# Patient Record
Sex: FEMALE | Race: WHITE | NOT HISPANIC OR LATINO | Employment: OTHER | URBAN - METROPOLITAN AREA
[De-identification: names, ages, dates, MRNs, and addresses within clinical notes are randomized per-mention and may not be internally consistent; named-entity substitution may affect disease eponyms.]

---

## 2021-01-04 ENCOUNTER — OFFICE VISIT (OUTPATIENT)
Dept: UROLOGY | Facility: CLINIC | Age: 56
End: 2021-01-04
Payer: COMMERCIAL

## 2021-01-04 VITALS
HEART RATE: 80 BPM | BODY MASS INDEX: 31.32 KG/M2 | WEIGHT: 188 LBS | DIASTOLIC BLOOD PRESSURE: 69 MMHG | SYSTOLIC BLOOD PRESSURE: 134 MMHG | HEIGHT: 65 IN

## 2021-01-04 DIAGNOSIS — N30.10 INTERSTITIAL CYSTITIS: ICD-10-CM

## 2021-01-04 DIAGNOSIS — R31.29 MICROSCOPIC HEMATURIA: Primary | ICD-10-CM

## 2021-01-04 LAB
ANION GAP SERPL CALC-SCNC: 10 MMOL/L (ref 8–17)
BUN/CREAT SERPL: 20.5 (ref 6–20)
CALCIUM SERPL-MCNC: 9.9 MG/DL (ref 8.6–10.2)
CARBON DIOXIDE, CO2: 29 MMOL/L (ref 22–29)
CHLORIDE: 99 MMOL/L (ref 98–107)
CREAT SERPL-MCNC: 0.62 MG/DL (ref 0.5–0.9)
GFR ESTIMATION: 99.22
GLUCOSE: 113 MG/DL (ref 74–106)
POTASSIUM: 5.1 MMOL/L (ref 3.5–5.1)
SODIUM: 138 MMOL/L (ref 136–145)
UREA NITROGEN (BUN): 12.7 MG/DL (ref 6–20)

## 2021-01-04 PROCEDURE — 36415 PR COLLECTION VENOUS BLOOD,VENIPUNCTURE: ICD-10-PCS | Mod: S$GLB,,, | Performed by: NURSE PRACTITIONER

## 2021-01-04 PROCEDURE — 99204 OFFICE O/P NEW MOD 45 MIN: CPT | Mod: S$GLB,,, | Performed by: NURSE PRACTITIONER

## 2021-01-04 PROCEDURE — 3008F PR BODY MASS INDEX (BMI) DOCUMENTED: ICD-10-PCS | Mod: CPTII,S$GLB,, | Performed by: NURSE PRACTITIONER

## 2021-01-04 PROCEDURE — 3008F BODY MASS INDEX DOCD: CPT | Mod: CPTII,S$GLB,, | Performed by: NURSE PRACTITIONER

## 2021-01-04 PROCEDURE — 36415 COLL VENOUS BLD VENIPUNCTURE: CPT | Mod: S$GLB,,, | Performed by: NURSE PRACTITIONER

## 2021-01-04 PROCEDURE — 99204 PR OFFICE/OUTPT VISIT, NEW, LEVL IV, 45-59 MIN: ICD-10-PCS | Mod: S$GLB,,, | Performed by: NURSE PRACTITIONER

## 2021-01-04 RX ORDER — METHENAMINE, SODIUM PHOSPHATE, MONOBASIC, ANHYDROUS, PHENYL SALICYLATE, METHYLENE BLUE AND HYOSCYAMINE SULFATE 118; 40.8; 36; 10; .12 MG/1; MG/1; MG/1; MG/1; MG/1
1 CAPSULE ORAL EVERY 6 HOURS PRN
COMMUNITY
Start: 2020-12-16 | End: 2021-02-19

## 2021-01-04 RX ORDER — LACTOBACIL 2/BIFIDO 1/S.THERMO 450B CELL
PACKET (EA) ORAL
COMMUNITY
Start: 2020-09-02

## 2021-01-04 RX ORDER — SERTRALINE HYDROCHLORIDE 100 MG/1
TABLET, FILM COATED ORAL
COMMUNITY
Start: 1998-12-29

## 2021-01-04 RX ORDER — METOPROLOL SUCCINATE 50 MG/1
TABLET, EXTENDED RELEASE ORAL
COMMUNITY
Start: 2000-12-29

## 2021-01-04 RX ORDER — CONJUGATED ESTROGENS AND MEDROXYPROGESTERONE ACETATE .45; 1.5 MG/1; MG/1
TABLET, SUGAR COATED ORAL
COMMUNITY
Start: 2018-12-29

## 2021-01-04 RX ORDER — OXYBUTYNIN CHLORIDE 10 MG/1
TABLET, EXTENDED RELEASE ORAL
COMMUNITY
Start: 2009-12-29 | End: 2021-02-03 | Stop reason: SDUPTHER

## 2021-01-04 RX ORDER — SULFASALAZINE 500 MG
2 TABLET ORAL 3 TIMES DAILY PRN
Refills: 0
Start: 2021-01-04 | End: 2022-08-11

## 2021-01-04 RX ORDER — PANCRELIPASE 24000; 76000; 120000 [USP'U]/1; [USP'U]/1; [USP'U]/1
CAPSULE, DELAYED RELEASE PELLETS ORAL
COMMUNITY
Start: 2019-01-29 | End: 2022-08-11 | Stop reason: ALTCHOICE

## 2021-01-04 RX ORDER — CHOLECALCIFEROL (VITAMIN D3) 125 MCG
CAPSULE ORAL
COMMUNITY
Start: 2019-01-02

## 2021-01-04 RX ORDER — ESOMEPRAZOLE MAGNESIUM 40 MG/1
CAPSULE, DELAYED RELEASE ORAL
COMMUNITY
Start: 1997-12-29

## 2021-01-04 RX ORDER — AMITRIPTYLINE HYDROCHLORIDE 25 MG/1
TABLET, FILM COATED ORAL
COMMUNITY
Start: 2009-12-29 | End: 2021-02-23

## 2021-01-11 ENCOUNTER — PROCEDURE VISIT (OUTPATIENT)
Dept: UROLOGY | Facility: CLINIC | Age: 56
End: 2021-01-11
Payer: COMMERCIAL

## 2021-01-11 VITALS
SYSTOLIC BLOOD PRESSURE: 158 MMHG | BODY MASS INDEX: 31.49 KG/M2 | HEART RATE: 86 BPM | HEIGHT: 65 IN | DIASTOLIC BLOOD PRESSURE: 89 MMHG | OXYGEN SATURATION: 93 % | RESPIRATION RATE: 18 BRPM | WEIGHT: 189 LBS

## 2021-01-11 DIAGNOSIS — N30.10 INTERSTITIAL CYSTITIS: ICD-10-CM

## 2021-01-11 DIAGNOSIS — R31.29 MICROSCOPIC HEMATURIA: Primary | ICD-10-CM

## 2021-01-11 DIAGNOSIS — R31.29 MICROSCOPIC HEMATURIA: ICD-10-CM

## 2021-01-11 PROCEDURE — 52000 CYSTOURETHROSCOPY: CPT | Mod: S$GLB,,, | Performed by: SPECIALIST

## 2021-01-11 PROCEDURE — 52000 PR CYSTOURETHROSCOPY: ICD-10-PCS | Mod: S$GLB,,, | Performed by: SPECIALIST

## 2021-01-11 RX ORDER — CIPROFLOXACIN 500 MG/1
500 TABLET ORAL
Status: COMPLETED | OUTPATIENT
Start: 2021-01-11 | End: 2021-01-11

## 2021-01-11 RX ORDER — HYDROXYZINE HYDROCHLORIDE 25 MG/1
TABLET, FILM COATED ORAL
COMMUNITY
Start: 2009-01-10 | End: 2021-03-30 | Stop reason: SDUPTHER

## 2021-01-11 RX ADMIN — CIPROFLOXACIN 500 MG: 500 TABLET ORAL at 09:01

## 2021-01-19 ENCOUNTER — CLINICAL SUPPORT (OUTPATIENT)
Dept: UROLOGY | Facility: CLINIC | Age: 56
End: 2021-01-19
Payer: COMMERCIAL

## 2021-01-19 DIAGNOSIS — R31.29 MICROSCOPIC HEMATURIA: Primary | ICD-10-CM

## 2021-01-21 ENCOUNTER — PATIENT MESSAGE (OUTPATIENT)
Dept: UROLOGY | Facility: CLINIC | Age: 56
End: 2021-01-21

## 2021-01-22 DIAGNOSIS — N30.10 INTERSTITIAL CYSTITIS: Primary | ICD-10-CM

## 2021-01-22 RX ORDER — TRAMADOL HYDROCHLORIDE 50 MG/1
50 TABLET ORAL EVERY 6 HOURS
Qty: 15 TABLET | Refills: 0 | Status: SHIPPED | OUTPATIENT
Start: 2021-01-22 | End: 2021-08-23

## 2021-01-26 ENCOUNTER — TELEPHONE (OUTPATIENT)
Dept: UROLOGY | Facility: CLINIC | Age: 56
End: 2021-01-26

## 2021-01-26 ENCOUNTER — OUTSIDE PLACE OF SERVICE (OUTPATIENT)
Dept: UROLOGY | Facility: CLINIC | Age: 56
End: 2021-01-26

## 2021-01-26 PROCEDURE — 52214 CYSTOSCOPY AND TREATMENT: CPT | Mod: ,,, | Performed by: SPECIALIST

## 2021-01-26 PROCEDURE — 52214 PR CYSTOURETHROSCOPY,FULGURATN: ICD-10-PCS | Mod: ,,, | Performed by: SPECIALIST

## 2021-01-29 LAB — SPECIMEN TO PATHOLOGY: NORMAL

## 2021-02-03 ENCOUNTER — TELEPHONE (OUTPATIENT)
Dept: UROLOGY | Facility: CLINIC | Age: 56
End: 2021-02-03

## 2021-02-03 RX ORDER — OXYBUTYNIN CHLORIDE 10 MG/1
10 TABLET, EXTENDED RELEASE ORAL DAILY
Qty: 30 TABLET | Refills: 11 | Status: SHIPPED | OUTPATIENT
Start: 2021-02-03 | End: 2021-12-27 | Stop reason: SDUPTHER

## 2021-02-08 ENCOUNTER — PATIENT MESSAGE (OUTPATIENT)
Dept: UROLOGY | Facility: CLINIC | Age: 56
End: 2021-02-08

## 2021-02-18 ENCOUNTER — PATIENT MESSAGE (OUTPATIENT)
Dept: UROLOGY | Facility: CLINIC | Age: 56
End: 2021-02-18

## 2021-02-19 RX ORDER — HYOSCYAMINE SULFATE 0.12 MG/1
TABLET SUBLINGUAL
COMMUNITY
Start: 2021-01-26 | End: 2022-03-17

## 2021-02-22 ENCOUNTER — PATIENT MESSAGE (OUTPATIENT)
Dept: UROLOGY | Facility: CLINIC | Age: 56
End: 2021-02-22

## 2021-02-22 ENCOUNTER — TELEPHONE (OUTPATIENT)
Dept: UROLOGY | Facility: CLINIC | Age: 56
End: 2021-02-22

## 2021-02-22 DIAGNOSIS — N30.10 INTERSTITIAL CYSTITIS: Primary | ICD-10-CM

## 2021-02-23 RX ORDER — AMITRIPTYLINE HYDROCHLORIDE 50 MG/1
50 TABLET, FILM COATED ORAL NIGHTLY
Qty: 30 TABLET | Refills: 11 | Status: SHIPPED | OUTPATIENT
Start: 2021-02-23 | End: 2022-03-07 | Stop reason: SDUPTHER

## 2021-02-25 ENCOUNTER — OFFICE VISIT (OUTPATIENT)
Dept: UROLOGY | Facility: CLINIC | Age: 56
End: 2021-02-25
Payer: COMMERCIAL

## 2021-02-25 VITALS
BODY MASS INDEX: 31.49 KG/M2 | DIASTOLIC BLOOD PRESSURE: 74 MMHG | SYSTOLIC BLOOD PRESSURE: 135 MMHG | HEIGHT: 65 IN | WEIGHT: 189 LBS

## 2021-02-25 DIAGNOSIS — N30.10 INTERSTITIAL CYSTITIS: Primary | ICD-10-CM

## 2021-02-25 DIAGNOSIS — R31.29 MICROSCOPIC HEMATURIA: ICD-10-CM

## 2021-02-25 PROCEDURE — 3008F BODY MASS INDEX DOCD: CPT | Mod: CPTII,S$GLB,, | Performed by: SPECIALIST

## 2021-02-25 PROCEDURE — 99213 OFFICE O/P EST LOW 20 MIN: CPT | Mod: S$GLB,,, | Performed by: SPECIALIST

## 2021-02-25 PROCEDURE — 3008F PR BODY MASS INDEX (BMI) DOCUMENTED: ICD-10-PCS | Mod: CPTII,S$GLB,, | Performed by: SPECIALIST

## 2021-02-25 PROCEDURE — 99213 PR OFFICE/OUTPT VISIT, EST, LEVL III, 20-29 MIN: ICD-10-PCS | Mod: S$GLB,,, | Performed by: SPECIALIST

## 2021-02-26 ENCOUNTER — IMMUNIZATION (OUTPATIENT)
Dept: HEMATOLOGY/ONCOLOGY | Facility: CLINIC | Age: 56
End: 2021-02-26
Payer: COMMERCIAL

## 2021-02-26 DIAGNOSIS — Z23 NEED FOR VACCINATION: Primary | ICD-10-CM

## 2021-02-26 PROCEDURE — 0011A COVID-19, MRNA, LNP-S, PF, 100 MCG/0.5 ML DOSE VACCINE: ICD-10-PCS | Mod: S$GLB,,, | Performed by: FAMILY MEDICINE

## 2021-02-26 PROCEDURE — 91301 COVID-19, MRNA, LNP-S, PF, 100 MCG/0.5 ML DOSE VACCINE: ICD-10-PCS | Mod: S$GLB,,, | Performed by: FAMILY MEDICINE

## 2021-02-26 PROCEDURE — 91301 COVID-19, MRNA, LNP-S, PF, 100 MCG/0.5 ML DOSE VACCINE: CPT | Mod: S$GLB,,, | Performed by: FAMILY MEDICINE

## 2021-02-26 PROCEDURE — 0011A COVID-19, MRNA, LNP-S, PF, 100 MCG/0.5 ML DOSE VACCINE: CPT | Mod: S$GLB,,, | Performed by: FAMILY MEDICINE

## 2021-03-26 ENCOUNTER — IMMUNIZATION (OUTPATIENT)
Dept: HEMATOLOGY/ONCOLOGY | Facility: CLINIC | Age: 56
End: 2021-03-26
Payer: COMMERCIAL

## 2021-03-26 DIAGNOSIS — Z23 NEED FOR VACCINATION: Primary | ICD-10-CM

## 2021-03-26 PROCEDURE — 91301 COVID-19, MRNA, LNP-S, PF, 100 MCG/0.5 ML DOSE VACCINE: CPT | Mod: S$GLB,,, | Performed by: FAMILY MEDICINE

## 2021-03-26 PROCEDURE — 0012A COVID-19, MRNA, LNP-S, PF, 100 MCG/0.5 ML DOSE VACCINE: CPT | Mod: CV19,S$GLB,, | Performed by: FAMILY MEDICINE

## 2021-03-26 PROCEDURE — 0012A COVID-19, MRNA, LNP-S, PF, 100 MCG/0.5 ML DOSE VACCINE: ICD-10-PCS | Mod: CV19,S$GLB,, | Performed by: FAMILY MEDICINE

## 2021-03-26 PROCEDURE — 91301 COVID-19, MRNA, LNP-S, PF, 100 MCG/0.5 ML DOSE VACCINE: ICD-10-PCS | Mod: S$GLB,,, | Performed by: FAMILY MEDICINE

## 2021-03-29 ENCOUNTER — PATIENT MESSAGE (OUTPATIENT)
Dept: UROLOGY | Facility: CLINIC | Age: 56
End: 2021-03-29

## 2021-03-30 ENCOUNTER — TELEPHONE (OUTPATIENT)
Dept: UROLOGY | Facility: CLINIC | Age: 56
End: 2021-03-30

## 2021-03-30 DIAGNOSIS — N30.10 INTERSTITIAL CYSTITIS: Primary | ICD-10-CM

## 2021-03-30 RX ORDER — HYDROXYZINE HYDROCHLORIDE 25 MG/1
25 TABLET, FILM COATED ORAL DAILY
Qty: 30 TABLET | Refills: 2 | Status: SHIPPED | OUTPATIENT
Start: 2021-03-30 | End: 2021-06-24

## 2021-08-23 ENCOUNTER — OFFICE VISIT (OUTPATIENT)
Dept: UROLOGY | Facility: CLINIC | Age: 56
End: 2021-08-23
Payer: COMMERCIAL

## 2021-08-23 VITALS
HEART RATE: 73 BPM | HEIGHT: 65 IN | DIASTOLIC BLOOD PRESSURE: 81 MMHG | SYSTOLIC BLOOD PRESSURE: 163 MMHG | WEIGHT: 185 LBS | BODY MASS INDEX: 30.82 KG/M2

## 2021-08-23 DIAGNOSIS — N30.10 INTERSTITIAL CYSTITIS: Primary | ICD-10-CM

## 2021-08-23 DIAGNOSIS — R31.29 MICROSCOPIC HEMATURIA: ICD-10-CM

## 2021-08-23 PROCEDURE — 1159F MED LIST DOCD IN RCRD: CPT | Mod: CPTII,S$GLB,, | Performed by: NURSE PRACTITIONER

## 2021-08-23 PROCEDURE — 3008F BODY MASS INDEX DOCD: CPT | Mod: CPTII,S$GLB,, | Performed by: NURSE PRACTITIONER

## 2021-08-23 PROCEDURE — 1159F PR MEDICATION LIST DOCUMENTED IN MEDICAL RECORD: ICD-10-PCS | Mod: CPTII,S$GLB,, | Performed by: NURSE PRACTITIONER

## 2021-08-23 PROCEDURE — 3079F PR MOST RECENT DIASTOLIC BLOOD PRESSURE 80-89 MM HG: ICD-10-PCS | Mod: CPTII,S$GLB,, | Performed by: NURSE PRACTITIONER

## 2021-08-23 PROCEDURE — 99213 OFFICE O/P EST LOW 20 MIN: CPT | Mod: S$GLB,,, | Performed by: NURSE PRACTITIONER

## 2021-08-23 PROCEDURE — 1160F RVW MEDS BY RX/DR IN RCRD: CPT | Mod: CPTII,S$GLB,, | Performed by: NURSE PRACTITIONER

## 2021-08-23 PROCEDURE — 3077F PR MOST RECENT SYSTOLIC BLOOD PRESSURE >= 140 MM HG: ICD-10-PCS | Mod: CPTII,S$GLB,, | Performed by: NURSE PRACTITIONER

## 2021-08-23 PROCEDURE — 99213 PR OFFICE/OUTPT VISIT, EST, LEVL III, 20-29 MIN: ICD-10-PCS | Mod: S$GLB,,, | Performed by: NURSE PRACTITIONER

## 2021-08-23 PROCEDURE — 3008F PR BODY MASS INDEX (BMI) DOCUMENTED: ICD-10-PCS | Mod: CPTII,S$GLB,, | Performed by: NURSE PRACTITIONER

## 2021-08-23 PROCEDURE — 3079F DIAST BP 80-89 MM HG: CPT | Mod: CPTII,S$GLB,, | Performed by: NURSE PRACTITIONER

## 2021-08-23 PROCEDURE — 1160F PR REVIEW ALL MEDS BY PRESCRIBER/CLIN PHARMACIST DOCUMENTED: ICD-10-PCS | Mod: CPTII,S$GLB,, | Performed by: NURSE PRACTITIONER

## 2021-08-23 PROCEDURE — 3077F SYST BP >= 140 MM HG: CPT | Mod: CPTII,S$GLB,, | Performed by: NURSE PRACTITIONER

## 2021-08-23 RX ORDER — FLUTICASONE FUROATE AND VILANTEROL TRIFENATATE 100; 25 UG/1; UG/1
POWDER RESPIRATORY (INHALATION)
COMMUNITY
Start: 2021-08-21 | End: 2022-08-11

## 2021-12-27 DIAGNOSIS — N30.10 INTERSTITIAL CYSTITIS: ICD-10-CM

## 2021-12-28 RX ORDER — OXYBUTYNIN CHLORIDE 10 MG/1
10 TABLET, EXTENDED RELEASE ORAL DAILY
Qty: 30 TABLET | Refills: 11 | Status: SHIPPED | OUTPATIENT
Start: 2021-12-28 | End: 2022-06-30

## 2021-12-28 RX ORDER — HYDROXYZINE HYDROCHLORIDE 25 MG/1
25 TABLET, FILM COATED ORAL DAILY
Qty: 30 TABLET | Refills: 2 | Status: SHIPPED | OUTPATIENT
Start: 2021-12-28 | End: 2022-05-05

## 2022-01-31 ENCOUNTER — TELEPHONE (OUTPATIENT)
Dept: UROLOGY | Facility: CLINIC | Age: 57
End: 2022-01-31

## 2022-01-31 ENCOUNTER — OFFICE VISIT (OUTPATIENT)
Dept: UROLOGY | Facility: CLINIC | Age: 57
End: 2022-01-31
Payer: COMMERCIAL

## 2022-01-31 VITALS — DIASTOLIC BLOOD PRESSURE: 83 MMHG | SYSTOLIC BLOOD PRESSURE: 180 MMHG | HEART RATE: 76 BPM

## 2022-01-31 DIAGNOSIS — R31.29 MICROSCOPIC HEMATURIA: ICD-10-CM

## 2022-01-31 DIAGNOSIS — N30.10 INTERSTITIAL CYSTITIS: Primary | ICD-10-CM

## 2022-01-31 PROCEDURE — 3077F SYST BP >= 140 MM HG: CPT | Mod: CPTII,S$GLB,, | Performed by: NURSE PRACTITIONER

## 2022-01-31 PROCEDURE — 81001 URINALYSIS AUTO W/SCOPE: CPT | Mod: S$GLB,,, | Performed by: NURSE PRACTITIONER

## 2022-01-31 PROCEDURE — 3079F PR MOST RECENT DIASTOLIC BLOOD PRESSURE 80-89 MM HG: ICD-10-PCS | Mod: CPTII,S$GLB,, | Performed by: NURSE PRACTITIONER

## 2022-01-31 PROCEDURE — 1160F RVW MEDS BY RX/DR IN RCRD: CPT | Mod: CPTII,S$GLB,, | Performed by: NURSE PRACTITIONER

## 2022-01-31 PROCEDURE — 1159F MED LIST DOCD IN RCRD: CPT | Mod: CPTII,S$GLB,, | Performed by: NURSE PRACTITIONER

## 2022-01-31 PROCEDURE — 99213 OFFICE O/P EST LOW 20 MIN: CPT | Mod: S$GLB,,, | Performed by: NURSE PRACTITIONER

## 2022-01-31 PROCEDURE — 1160F PR REVIEW ALL MEDS BY PRESCRIBER/CLIN PHARMACIST DOCUMENTED: ICD-10-PCS | Mod: CPTII,S$GLB,, | Performed by: NURSE PRACTITIONER

## 2022-01-31 PROCEDURE — 1159F PR MEDICATION LIST DOCUMENTED IN MEDICAL RECORD: ICD-10-PCS | Mod: CPTII,S$GLB,, | Performed by: NURSE PRACTITIONER

## 2022-01-31 PROCEDURE — 3077F PR MOST RECENT SYSTOLIC BLOOD PRESSURE >= 140 MM HG: ICD-10-PCS | Mod: CPTII,S$GLB,, | Performed by: NURSE PRACTITIONER

## 2022-01-31 PROCEDURE — 3079F DIAST BP 80-89 MM HG: CPT | Mod: CPTII,S$GLB,, | Performed by: NURSE PRACTITIONER

## 2022-01-31 PROCEDURE — 99213 PR OFFICE/OUTPT VISIT, EST, LEVL III, 20-29 MIN: ICD-10-PCS | Mod: S$GLB,,, | Performed by: NURSE PRACTITIONER

## 2022-01-31 PROCEDURE — 81001 PR  URINALYSIS, AUTO, W/SCOPE: ICD-10-PCS | Mod: S$GLB,,, | Performed by: NURSE PRACTITIONER

## 2022-01-31 RX ORDER — FUROSEMIDE 20 MG/1
TABLET ORAL
COMMUNITY
Start: 2021-09-07 | End: 2022-03-17

## 2022-01-31 NOTE — TELEPHONE ENCOUNTER
MD agreed with plan of care, please call patient to pick date for cystoscopy with hydrodistension.

## 2022-01-31 NOTE — PROGRESS NOTES
Chief Complaint:   Chief Complaint   Patient presents with    Other     IC flare up       HPI: 56-year-old female who presents for follow up interstitial cystitis.     She initially presented to us with complaints of a flare up of interstitial cystitis.  She was found to have microscopic hematuria.  She underwent full workup with a CT urogram as well as office cystoscopy. Office cystoscopy showed an area on the left lateral wall suspicious of a Hunner's ulcer.  She was taken to the operating room on January 26, 2021 for hydrodistension and fulguration of Hunner's ulcer. She has been doing a lot better post procedure, urinary frequency decreased and dysuria almost completely resolved.    She recently began experiencing exacerbation of symptoms. She has increased her dose of PRN aloe vera which subsequently caused an upper body rash. She presented to her dermatologist prior to her office visit today which has prescribed steroids. She is interested in undergoing another cystoscopy with hydro distension to help with her symptoms.  She is still maintained on amitriptyline, hydroxyzine, oxybutynin, and aloe vera p.r.n.    She denies any other new urological complaints.    Allergies:  Penicillins    Medications: has a current medication list which includes the following prescription(s): aloe vera, amitriptyline, biotin, breo ellipta, esomeprazole, prempro, furosemide, hydroxyzine hcl, hyoscyamine, visbiome, creon, metoprolol succinate, oxybutynin, and sertraline.    Review of Systems:  General: No fever, chills, vision changes, dizziness, weakness, fatigue, unexplained weight loss, confusion, or mood swings.  Skin: No rashes, itching, or changes in color/texture of skin.  Chest: Denies OROZCO, cyanosis, wheezing, cough, and sputum production.  Abdomen: Appetite fine. Denies diarrhea, abdominal pain, hematemesis, or blood in stool.  Musculoskeletal: No joint stiffness or swelling. No painful lymph nodes  : As above.  All  other review of systems negative.    PMH:   has a past medical history of Glaucoma, Hypertension, and Interstitial cystitis.    PSH:   has a past surgical history that includes Gallbladder surgery (02/2019); Cystogastrostomy; Tonsillectomy; and Cystoscopy.    FamHx: family history includes Diabetes in her mother; Glaucoma in her father; Hypertension in her father and mother; Liver disease in her mother.    SocHx:  reports that she has never smoked. She has never used smokeless tobacco. She reports current alcohol use of about 2.0 standard drinks of alcohol per week. She reports that she does not use drugs.      Physical Exam:  Vitals:    01/31/22 1517   BP: (!) 180/83   Pulse: 76     General: AAOx3, no apparent distress, no deformities  Neck: supple, no masses, normal thyroid, full ROM  Lungs: CTAB, no adventitious breath sounds, normal inspiration, no use of accessory muscles  Heart: regular rate and rhythm, no arrhythmias  Abdomen: soft, NT, ND, no masses, no hernias, no hepatosplenomegaly  Lymphatic: no unusually enlarged or tender lymph nodes  Skin: warm and dry, no jaundice, +rash to chest and BUE  Ext: without edema or deformity, SAUCEDA, ambulates independently  : deferred    Labs/Studies: UA voided sample negative for infection or hematuria    Impression/Plan:   Interstitial cystitis  Comments:  s/p hydrodistension 1/2021-ready for procedure again d/t exacerbation, education provided, cont oxybutynin, amitriptyline, hydroxyzine, and aloe vera PRN  Orders:  -     POCT Urinalysis (w/Micro Option)    Microscopic hematuria  Comments:  complete workup in early 2021, no episodes of gross hematuria    **attempted to contact MD at the time of office visit, will verify plan prior to proceeding**    Follow up for cystoscopy with hydrodistension.

## 2022-02-02 ENCOUNTER — PATIENT MESSAGE (OUTPATIENT)
Dept: UROLOGY | Facility: CLINIC | Age: 57
End: 2022-02-02
Payer: COMMERCIAL

## 2022-02-02 RX ORDER — PHENAZOPYRIDINE HYDROCHLORIDE 100 MG/1
100 TABLET, FILM COATED ORAL 3 TIMES DAILY PRN
Qty: 6 TABLET | Refills: 0 | Status: SHIPPED | OUTPATIENT
Start: 2022-02-02 | End: 2022-02-04

## 2022-02-02 NOTE — TELEPHONE ENCOUNTER
Please call patient and let her know that I have sent pyridium to the pharmacy, if that doesn't normally help for her then she can  some samples of Uribel

## 2022-02-07 LAB
ANION GAP SERPL CALC-SCNC: 10 MMOL/L (ref 3–11)
APPEARANCE, UA: CLEAR
BACTERIA SPEC CULT: ABNORMAL /HPF
BASOPHILS NFR BLD: 0.3 % (ref 0–3)
BILIRUB UR QL STRIP: ABNORMAL MG/DL
BUN SERPL-MCNC: 19 MG/DL (ref 7–18)
BUN/CREAT SERPL: 27.53 RATIO (ref 7–18)
CALCIUM OXALATE CRYSTALS, UA: ABNORMAL /LPF
CALCIUM SERPL-MCNC: 9.6 MG/DL (ref 8.8–10.5)
CHLORIDE SERPL-SCNC: 100 MMOL/L (ref 100–108)
CO2 SERPL-SCNC: 29 MMOL/L (ref 21–32)
COLOR UR: ABNORMAL
CREAT SERPL-MCNC: 0.69 MG/DL (ref 0.55–1.02)
EOSINOPHIL NFR BLD: 0.1 % (ref 1–3)
ERYTHROCYTE [DISTWIDTH] IN BLOOD BY AUTOMATED COUNT: 13.2 % (ref 12.5–18)
GFR ESTIMATION: > 60
GLUCOSE (UA): NORMAL MG/DL
GLUCOSE SERPL-MCNC: 121 MG/DL (ref 70–110)
HCT VFR BLD AUTO: 45.2 % (ref 37–47)
HGB BLD-MCNC: 15.1 G/DL (ref 12–16)
HGB UR QL STRIP: 10 /UL
KETONES UR QL STRIP: NEGATIVE MG/DL
LEUKOCYTE ESTERASE UR QL STRIP: 25 /UL
LYMPHOCYTES NFR BLD: 17.1 % (ref 25–40)
MCH RBC QN AUTO: 26.8 PG (ref 27–31.2)
MCHC RBC AUTO-ENTMCNC: 33.4 G/DL (ref 31.8–35.4)
MCV RBC AUTO: 80.3 FL (ref 80–97)
MONOCYTES NFR BLD: 6.9 % (ref 1–15)
NEUTROPHILS # BLD AUTO: 10.6 10*3/UL (ref 1.8–7.7)
NEUTROPHILS NFR BLD: 75.1 % (ref 37–80)
NITRITE UR QL STRIP: POSITIVE
NUCLEATED RED BLOOD CELLS: 0 %
PH UR STRIP: 5 PH (ref 5–9)
PLATELETS: 252 10*3/UL (ref 142–424)
POTASSIUM SERPL-SCNC: 4.1 MMOL/L (ref 3.6–5.2)
PROT UR QL STRIP: 30 MG/DL
RBC # BLD AUTO: 5.63 10*6/UL (ref 4.2–5.4)
RBC #/AREA URNS HPF: ABNORMAL /HPF (ref 0–2)
SERVICE COMMENT 03: ABNORMAL
SODIUM BLD-SCNC: 139 MMOL/L (ref 135–145)
SP GR UR STRIP: 1.02 (ref 1–1.03)
SPECIMEN COLLECTION METHOD, URINE: ABNORMAL
SQUAMOUS EPITHELIAL, UA: ABNORMAL /LPF
UROBILINOGEN UR STRIP-ACNC: 8 MG/DL
WBC # BLD: 14.1 10*3/UL (ref 4.6–10.2)
WBC #/AREA URNS HPF: ABNORMAL /HPF (ref 0–5)

## 2022-02-09 LAB — URINE CULTURE, ROUTINE: NORMAL

## 2022-02-11 ENCOUNTER — OUTSIDE PLACE OF SERVICE (OUTPATIENT)
Dept: UROLOGY | Facility: CLINIC | Age: 57
End: 2022-02-11
Payer: COMMERCIAL

## 2022-02-11 PROCEDURE — 52260 PR CYSTOSCOPY,DIL BLADDER,GEN ANESTH: ICD-10-PCS | Mod: ,,, | Performed by: UROLOGY

## 2022-02-11 PROCEDURE — 52260 CYSTOSCOPY AND TREATMENT: CPT | Mod: ,,, | Performed by: UROLOGY

## 2022-03-07 ENCOUNTER — PATIENT MESSAGE (OUTPATIENT)
Dept: UROLOGY | Facility: CLINIC | Age: 57
End: 2022-03-07
Payer: COMMERCIAL

## 2022-03-07 DIAGNOSIS — N30.10 INTERSTITIAL CYSTITIS: ICD-10-CM

## 2022-03-07 RX ORDER — AMITRIPTYLINE HYDROCHLORIDE 50 MG/1
50 TABLET, FILM COATED ORAL NIGHTLY
Qty: 30 TABLET | Refills: 11 | Status: SHIPPED | OUTPATIENT
Start: 2022-03-07 | End: 2023-02-03

## 2022-03-17 ENCOUNTER — OFFICE VISIT (OUTPATIENT)
Dept: UROLOGY | Facility: CLINIC | Age: 57
End: 2022-03-17
Payer: COMMERCIAL

## 2022-03-17 DIAGNOSIS — N39.0 URINARY TRACT INFECTION WITHOUT HEMATURIA, SITE UNSPECIFIED: ICD-10-CM

## 2022-03-17 DIAGNOSIS — N30.10 INTERSTITIAL CYSTITIS: Primary | ICD-10-CM

## 2022-03-17 PROCEDURE — 1159F MED LIST DOCD IN RCRD: CPT | Mod: CPTII,S$GLB,, | Performed by: NURSE PRACTITIONER

## 2022-03-17 PROCEDURE — 99214 OFFICE O/P EST MOD 30 MIN: CPT | Mod: S$GLB,,, | Performed by: NURSE PRACTITIONER

## 2022-03-17 PROCEDURE — 1159F PR MEDICATION LIST DOCUMENTED IN MEDICAL RECORD: ICD-10-PCS | Mod: CPTII,S$GLB,, | Performed by: NURSE PRACTITIONER

## 2022-03-17 PROCEDURE — 1160F PR REVIEW ALL MEDS BY PRESCRIBER/CLIN PHARMACIST DOCUMENTED: ICD-10-PCS | Mod: CPTII,S$GLB,, | Performed by: NURSE PRACTITIONER

## 2022-03-17 PROCEDURE — 99214 PR OFFICE/OUTPT VISIT, EST, LEVL IV, 30-39 MIN: ICD-10-PCS | Mod: S$GLB,,, | Performed by: NURSE PRACTITIONER

## 2022-03-17 PROCEDURE — 1160F RVW MEDS BY RX/DR IN RCRD: CPT | Mod: CPTII,S$GLB,, | Performed by: NURSE PRACTITIONER

## 2022-03-17 RX ORDER — CIPROFLOXACIN 500 MG/1
500 TABLET ORAL EVERY 12 HOURS
Qty: 20 TABLET | Refills: 0 | Status: SHIPPED | OUTPATIENT
Start: 2022-03-17 | End: 2022-03-27

## 2022-03-17 NOTE — PROGRESS NOTES
Subjective:       Patient ID: Karen Meyers is a 56 y.o. female.    Chief Complaint: 2 wk f/u - hydrodistention      HPI: 56-year-old female known to the service returns status post bladder hydrodistention.  This was done in the care of Dr. Quezada.  Some see op report.  Patient states all her symptoms have resolved post hydrodistention.  Today she is complaining of some pain with urination.  She states this is definitely not interstitial cystitis pain is been ongoing for several days now.  She has seen no blood in urine she has been afebrile.  No other voiding complaints.       Past Medical History:   Past Medical History:   Diagnosis Date    Glaucoma     Hypertension     Interstitial cystitis        Past Surgical Historical:   Past Surgical History:   Procedure Laterality Date    CYSTOGASTROSTOMY      CYSTOSCOPY      GALLBLADDER SURGERY  02/2019    TONSILLECTOMY          Medications:   Medication List with Changes/Refills   Current Medications    ALOE VERA 5,000 MG CAP    Take 2 capsules by mouth 3 (three) times daily as needed.    AMITRIPTYLINE (ELAVIL) 50 MG TABLET    Take 1 tablet (50 mg total) by mouth every evening.    BIOTIN 5,000 MCG TBDL        BREO ELLIPTA 100-25 MCG/DOSE DISKUS INHALER        ESOMEPRAZOLE (NEXIUM) 40 MG CAPSULE        ESTROGEN, CONJUGATED,-MEDROXYPROGESTERONE (PREMPRO) 0.45-1.5 MG PER TABLET        FUROSEMIDE (LASIX) 20 MG TABLET        HYDROXYZINE HCL (ATARAX) 25 MG TABLET    Take 1 tablet (25 mg total) by mouth once daily.    HYOSCYAMINE (LEVSIN/SL) 0.125 MG SUBL    DISSLOVE 2 TABLET UNDER THE TONGUE EVERY 4 HOURS AS NEEDED FOR SPASMS    LACTOBAC #2-BIFIDO #1-S. THERM (VISBIOME) 112.5 BILLION CELL CAP        LIPASE-PROTEASE-AMYLASE 24,000-76,000-120,000 UNITS (CREON) 24,000-76,000 -120,000 UNIT CAPSULE        METOPROLOL SUCCINATE (TOPROL-XL) 50 MG 24 HR TABLET        OXYBUTYNIN (DITROPAN-XL) 10 MG 24 HR TABLET    Take 1 tablet (10 mg total) by mouth once daily.    SERTRALINE  (ZOLOFT) 100 MG TABLET            Past Social History:   Social History     Socioeconomic History    Marital status:    Tobacco Use    Smoking status: Never Smoker    Smokeless tobacco: Never Used   Substance and Sexual Activity    Alcohol use: Yes     Alcohol/week: 2.0 standard drinks     Types: 2 Glasses of wine per week     Comment: with supper    Drug use: Never       Allergies:   Review of patient's allergies indicates:   Allergen Reactions    Penicillins Hives and Itching        Family History:   Family History   Problem Relation Age of Onset    Hypertension Father     Glaucoma Father     Hypertension Mother     Diabetes Mother     Liver disease Mother         Review of Systems:  Review of Systems   Constitutional: Negative for activity change and appetite change.   HENT: Negative for congestion and dental problem.    Respiratory: Negative for chest tightness and shortness of breath.    Cardiovascular: Negative for chest pain.   Gastrointestinal: Negative for abdominal distention and abdominal pain.   Genitourinary: Positive for dysuria. Negative for decreased urine volume, difficulty urinating, dyspareunia, enuresis, flank pain, frequency, genital sores, hematuria, pelvic pain and urgency.   Musculoskeletal: Negative for back pain and neck pain.   Allergic/Immunologic: Negative for immunocompromised state.   Neurological: Negative for dizziness.   Hematological: Negative for adenopathy.   Psychiatric/Behavioral: Negative for agitation, behavioral problems and confusion.       Physical Exam:  Physical Exam  Constitutional:       Appearance: She is well-developed.   HENT:      Head: Normocephalic and atraumatic.   Eyes:      General: No scleral icterus.  Pulmonary:      Effort: Pulmonary effort is normal.      Breath sounds: Normal breath sounds.   Abdominal:      General: There is no distension.      Palpations: Abdomen is soft.      Tenderness: There is no abdominal tenderness.    Genitourinary:     Exam position: Supine.      Labia:         Right: No rash, tenderness or lesion.         Left: No rash, tenderness or lesion.       Vagina: Normal.      Rectum: Normal.   Musculoskeletal:      Cervical back: Normal range of motion.   Skin:     General: Skin is warm and dry.   Neurological:      Mental Status: She is alert and oriented to person, place, and time.         Assessment/Plan:   Acute interstitial cystitis flare--resolved status post bladder hydrodistention.    Dysuria--urinalysis WBC 20 5-50, 3-5 red cells, no skin cells, 1+ bacteria nitrite negative.  Culture urine.  Started patient on ciprofloxacin pending sensitivity results next week  Problem List Items Addressed This Visit        Renal/    Interstitial cystitis - Primary    Relevant Orders    POCT Urinalysis (w/Micro Option)

## 2022-03-20 LAB — URINE CULTURE, ROUTINE: NORMAL

## 2022-03-22 ENCOUNTER — TELEPHONE (OUTPATIENT)
Dept: UROLOGY | Facility: CLINIC | Age: 57
End: 2022-03-22
Payer: COMMERCIAL

## 2022-03-22 NOTE — TELEPHONE ENCOUNTER
Unable to leave message.  SAMIA RAMIREZ    ----- Message from Pranay Garnica NP sent at 3/21/2022  1:52 PM CDT -----  Mixed growth no susceptibility.  No need to treat.

## 2022-03-24 ENCOUNTER — PATIENT MESSAGE (OUTPATIENT)
Dept: UROLOGY | Facility: CLINIC | Age: 57
End: 2022-03-24
Payer: COMMERCIAL

## 2022-03-25 ENCOUNTER — TELEPHONE (OUTPATIENT)
Dept: UROLOGY | Facility: CLINIC | Age: 57
End: 2022-03-25
Payer: COMMERCIAL

## 2022-03-25 NOTE — TELEPHONE ENCOUNTER
----- Message from Pranay Garnica NP sent at 3/21/2022  1:52 PM CDT -----  Mixed growth no susceptibility.  No need to treat.  
Attempted to verify that patient was aware of results. I saw that she communicated with another nurse via portal. [facility] LPN  
Neoplasm related pain

## 2022-06-23 ENCOUNTER — PATIENT MESSAGE (OUTPATIENT)
Dept: UROLOGY | Facility: CLINIC | Age: 57
End: 2022-06-23

## 2022-06-30 ENCOUNTER — OFFICE VISIT (OUTPATIENT)
Dept: UROLOGY | Facility: CLINIC | Age: 57
End: 2022-06-30
Payer: COMMERCIAL

## 2022-06-30 DIAGNOSIS — N30.10 INTERSTITIAL CYSTITIS: Primary | ICD-10-CM

## 2022-06-30 PROCEDURE — 1160F RVW MEDS BY RX/DR IN RCRD: CPT | Mod: CPTII,S$GLB,, | Performed by: NURSE PRACTITIONER

## 2022-06-30 PROCEDURE — 99214 PR OFFICE/OUTPT VISIT, EST, LEVL IV, 30-39 MIN: ICD-10-PCS | Mod: S$GLB,,, | Performed by: NURSE PRACTITIONER

## 2022-06-30 PROCEDURE — 99214 OFFICE O/P EST MOD 30 MIN: CPT | Mod: S$GLB,,, | Performed by: NURSE PRACTITIONER

## 2022-06-30 PROCEDURE — 1159F MED LIST DOCD IN RCRD: CPT | Mod: CPTII,S$GLB,, | Performed by: NURSE PRACTITIONER

## 2022-06-30 PROCEDURE — 1160F PR REVIEW ALL MEDS BY PRESCRIBER/CLIN PHARMACIST DOCUMENTED: ICD-10-PCS | Mod: CPTII,S$GLB,, | Performed by: NURSE PRACTITIONER

## 2022-06-30 PROCEDURE — 1159F PR MEDICATION LIST DOCUMENTED IN MEDICAL RECORD: ICD-10-PCS | Mod: CPTII,S$GLB,, | Performed by: NURSE PRACTITIONER

## 2022-06-30 NOTE — PROGRESS NOTES
Subjective:       Patient ID: Karen Meyers is a 56 y.o. female.    Chief Complaint: No chief complaint on file.      HPI: 56-year-old female known service history of interstitial cystitis.  She underwent cysto with bladder hydrodistention in a not too distant past.  With good result.  Today she returns 3 month follow-up complains of recurrent bladder discomfort aggravated with activities prolonged standing.  States her bladder spasms are no longer controlled by the oxybutynin.  She denies any symptoms of a UTI.  Denies gross hematuria she has been afebrile.       Past Medical History:   Past Medical History:   Diagnosis Date    Glaucoma     Hypertension     Interstitial cystitis        Past Surgical Historical:   Past Surgical History:   Procedure Laterality Date    CYSTOGASTROSTOMY      CYSTOSCOPY      GALLBLADDER SURGERY  02/2019    TONSILLECTOMY          Medications:   Medication List with Changes/Refills   Current Medications    ALOE VERA 5,000 MG CAP    Take 2 capsules by mouth 3 (three) times daily as needed.    AMITRIPTYLINE (ELAVIL) 50 MG TABLET    Take 1 tablet (50 mg total) by mouth every evening.    BIOTIN 5,000 MCG TBDL        BREO ELLIPTA 100-25 MCG/DOSE DISKUS INHALER        ESOMEPRAZOLE (NEXIUM) 40 MG CAPSULE        ESTROGEN, CONJUGATED,-MEDROXYPROGESTERONE (PREMPRO) 0.45-1.5 MG PER TABLET        HYDROXYZINE HCL (ATARAX) 25 MG TABLET    TAKE 1 TABLET(25 MG) BY MOUTH EVERY DAY    LACTOBAC #2-BIFIDO #1-S. THERM (VISBIOME) 112.5 BILLION CELL CAP        LIPASE-PROTEASE-AMYLASE 24,000-76,000-120,000 UNITS (CREON) 24,000-76,000 -120,000 UNIT CAPSULE        METOPROLOL SUCCINATE (TOPROL-XL) 50 MG 24 HR TABLET        SERTRALINE (ZOLOFT) 100 MG TABLET       Discontinued Medications    OXYBUTYNIN (DITROPAN-XL) 10 MG 24 HR TABLET    Take 1 tablet (10 mg total) by mouth once daily.        Past Social History:   Social History     Socioeconomic History    Marital status:    Tobacco Use    Smoking  status: Never Smoker    Smokeless tobacco: Never Used   Substance and Sexual Activity    Alcohol use: Yes     Alcohol/week: 2.0 standard drinks     Types: 2 Glasses of wine per week     Comment: with supper    Drug use: Never       Allergies:   Review of patient's allergies indicates:   Allergen Reactions    Penicillins Hives and Itching        Family History:   Family History   Problem Relation Age of Onset    Hypertension Father     Glaucoma Father     Hypertension Mother     Diabetes Mother     Liver disease Mother         Review of Systems:  Review of Systems   Constitutional: Negative for activity change and appetite change.   HENT: Negative for congestion and dental problem.    Respiratory: Negative for chest tightness and shortness of breath.    Cardiovascular: Negative for chest pain.   Gastrointestinal: Negative for abdominal distention and abdominal pain.   Genitourinary: Negative for decreased urine volume, difficulty urinating, dyspareunia, dysuria, enuresis, flank pain, frequency, genital sores, hematuria, pelvic pain and urgency.   Musculoskeletal: Negative for back pain and neck pain.   Allergic/Immunologic: Negative for immunocompromised state.   Neurological: Negative for dizziness.   Hematological: Negative for adenopathy.   Psychiatric/Behavioral: Negative for agitation, behavioral problems and confusion.       Physical Exam:  Physical Exam  Constitutional:       Appearance: She is well-developed.   HENT:      Head: Normocephalic and atraumatic.   Eyes:      General: No scleral icterus.  Pulmonary:      Effort: Pulmonary effort is normal.      Breath sounds: Normal breath sounds.   Abdominal:      General: There is no distension.      Palpations: Abdomen is soft.      Tenderness: There is no abdominal tenderness.   Genitourinary:     Exam position: Supine.      Labia:         Right: No rash, tenderness or lesion.         Left: No rash, tenderness or lesion.       Vagina: Normal.       Rectum: Normal.   Musculoskeletal:      Cervical back: Normal range of motion.   Skin:     General: Skin is warm and dry.   Neurological:      Mental Status: She is alert and oriented to person, place, and time.         Assessment/Plan:   Interstitial cystitis--suboptimal control at this time.  Place patient on IC 2 caps known bra only now 80 mg and L arginine 500 mg 1 tab each  3 times daily.  DC oxybutynin extended release.  Trial Myrbetriq 50 mg 1 p.o. daily 1 month sample dispensed.  If all else fails discussed possible DMSO instillations.  Patient in agreement with plan of care at this time.  UA--negative  Problem List Items Addressed This Visit    None

## 2022-07-11 DIAGNOSIS — N30.10 INTERSTITIAL CYSTITIS: Primary | ICD-10-CM

## 2022-07-11 NOTE — TELEPHONE ENCOUNTER
Patient called and stated that she is having a very bad flare up of IC and she stated that would like to know if there is anything they can do for her. Please advise. SAMIA RAMIREZ    ----- Message from Rhea Cardoza sent at 7/11/2022  9:58 AM CDT -----  Contact: Pt  Type:  Sooner Appointment Request    Caller is requesting a sooner appointment.  Caller declined first available appointment listed below.  Caller will not accept being placed on the waitlist and is requesting a message be sent to doctor.  Name of Caller: Karen    When is the first available appointment? 08/04/2022   Symptoms: severe pain   Would the patient rather a call back or a response via [facility]?  Callback    Best Call Back Number:  [phone] (home)          Additional Information:

## 2022-07-12 NOTE — TELEPHONE ENCOUNTER
Per Dr. Quezada call out [facility]. Will place order for DMSO treatmetn. Follow up with agueda as needed.

## 2022-07-13 RX ORDER — OXYBUTYNIN CHLORIDE 10 MG/1
TABLET, EXTENDED RELEASE ORAL
COMMUNITY
Start: 2022-07-06 | End: 2022-08-11

## 2022-07-13 RX ORDER — METHENAMINE, SODIUM PHOSPHATE, MONOBASIC, MONOHYDRATE, PHENYL SALICYLATE, METHYLENE BLUE, AND HYOSCYAMINE SULFATE 40.8; 36.2; .12; 10.8; 81.6 MG/1; MG/1; MG/1; MG/1; MG/1
1 TABLET ORAL 4 TIMES DAILY PRN
COMMUNITY
Start: 2022-06-24 | End: 2022-08-11 | Stop reason: ALTCHOICE

## 2022-07-13 RX ORDER — HYDROCODONE BITARTRATE AND ACETAMINOPHEN 10; 325 MG/1; MG/1
1 TABLET ORAL EVERY 6 HOURS
COMMUNITY
Start: 2022-02-11 | End: 2022-08-11

## 2022-07-13 RX ORDER — DEXLANSOPRAZOLE 30 MG/1
CAPSULE, DELAYED RELEASE ORAL
COMMUNITY
End: 2022-08-11

## 2022-07-13 RX ORDER — NORETHINDRONE ACETATE AND ETHINYL ESTRADIOL 1MG-20(21)
KIT ORAL
COMMUNITY
End: 2022-08-11

## 2022-07-13 RX ORDER — CALCIUM POLYCARBOPHIL 625 MG
TABLET ORAL
COMMUNITY
End: 2022-08-11 | Stop reason: ALTCHOICE

## 2022-07-13 RX ORDER — PREDNISONE 10 MG/1
TABLET ORAL
COMMUNITY
Start: 2022-01-31 | End: 2022-08-11 | Stop reason: ALTCHOICE

## 2022-07-13 RX ORDER — LISINOPRIL 20 MG/1
TABLET ORAL
COMMUNITY
End: 2022-09-08

## 2022-07-13 RX ORDER — CYCLOSPORINE 0.5 MG/ML
EMULSION OPHTHALMIC
COMMUNITY
End: 2022-08-11

## 2022-07-13 RX ORDER — OLOPATADINE HYDROCHLORIDE 1 MG/ML
SOLUTION/ DROPS OPHTHALMIC
COMMUNITY
End: 2022-08-11 | Stop reason: ALTCHOICE

## 2022-07-13 RX ORDER — METRONIDAZOLE 10 MG/G
GEL TOPICAL
COMMUNITY
End: 2022-08-11

## 2022-07-13 RX ORDER — CETIRIZINE HYDROCHLORIDE 10 MG/1
TABLET ORAL
COMMUNITY
End: 2022-08-11

## 2022-07-21 ENCOUNTER — PATIENT MESSAGE (OUTPATIENT)
Dept: UROLOGY | Facility: CLINIC | Age: 57
End: 2022-07-21

## 2022-07-22 ENCOUNTER — TELEPHONE (OUTPATIENT)
Dept: UROLOGY | Facility: CLINIC | Age: 57
End: 2022-07-22
Payer: COMMERCIAL

## 2022-07-22 ENCOUNTER — PATIENT MESSAGE (OUTPATIENT)
Dept: UROLOGY | Facility: CLINIC | Age: 57
End: 2022-07-22
Payer: COMMERCIAL

## 2022-07-22 NOTE — TELEPHONE ENCOUNTER
----- Message from Cristiana Bernstein sent at 7/22/2022  2:04 PM CDT -----  Type:  Needs Medical Advice    Who Called: Karen Meyers    Symptoms (please be specific): IC Pain   How long has patient had these symptoms:  -  Pharmacy name and phone #:  -  Would the patient rather a call back or a response via [facility]? CB   Best Call Back Number: [phone]    Additional Information:  pt wants to speak w/ office about DMSO treatments, please call

## 2022-07-25 ENCOUNTER — TELEPHONE (OUTPATIENT)
Dept: UROLOGY | Facility: CLINIC | Age: 57
End: 2022-07-25
Payer: COMMERCIAL

## 2022-07-25 NOTE — TELEPHONE ENCOUNTER
Attempted to contact pt regarding getting dmso treatments scheduled but the phone rang once and went to vm. There was no vm set up to leave message. Will attempt to call again.

## 2022-07-25 NOTE — TELEPHONE ENCOUNTER
----- Message from Maya Pham LPN sent at 7/22/2022 10:28 AM CDT -----    ----- Message -----  From: Ness Pavon  Sent: 7/22/2022  10:04 AM CDT  To: [facility] Staff    Karen Meyers would like a call back as soon as possible. She said she has called about her ic pain and left messages to get an update on DMSO treatment but haven't heard back from anyone. Please give her a call back at [phone] (home)

## 2022-07-29 ENCOUNTER — CLINICAL SUPPORT (OUTPATIENT)
Dept: UROLOGY | Facility: CLINIC | Age: 57
End: 2022-07-29
Payer: COMMERCIAL

## 2022-07-29 DIAGNOSIS — N30.10 INTERSTITIAL CYSTITIS: Primary | ICD-10-CM

## 2022-07-29 NOTE — PROGRESS NOTES
Treatment #1. Cleansed urethra with Hibiclense. Inserted 14 Bhutanese red catheter into patient's bladder and drained the bladder. Step #1 pre-mixed (Heparin 1,000u/ml and Dexamethasone 8mg) instilled (slow push). Catheter held in place with plug. Patient was instructed to hold and rotate every 10 minutes for 30 minutes. After draining step #1 from bladder Catheter was discontinued. . Patient tolerated procedure without complaints. Pt verbalized understanding of post instructions.

## 2022-08-05 ENCOUNTER — CLINICAL SUPPORT (OUTPATIENT)
Dept: UROLOGY | Facility: CLINIC | Age: 57
End: 2022-08-05
Payer: COMMERCIAL

## 2022-08-05 DIAGNOSIS — N30.10 INTERSTITIAL CYSTITIS: Primary | ICD-10-CM

## 2022-08-05 NOTE — PROGRESS NOTES
Treatment #2. Cleansed urethra with Hibiclense. Inserted 14 [place]an red catheter into patient's bladder and drained the bladder. Step #1 pre-mixed (Heparin 1,000u/ml and Dexamethasone 8mg) instilled (slow push). Catheter held in place with plug. Patient was instructed to hold and rotate every 10 minutes for 30 minutes. After draining step #1 from bladder . Catheter was discontinued. Patient tolerated procedure without complaints. Pt verbalized understanding of post instructions.

## 2022-08-11 ENCOUNTER — OFFICE VISIT (OUTPATIENT)
Dept: UROLOGY | Facility: CLINIC | Age: 57
End: 2022-08-11
Payer: COMMERCIAL

## 2022-08-11 VITALS
WEIGHT: 209 LBS | SYSTOLIC BLOOD PRESSURE: 133 MMHG | HEART RATE: 73 BPM | HEIGHT: 65 IN | BODY MASS INDEX: 34.82 KG/M2 | RESPIRATION RATE: 20 BRPM | DIASTOLIC BLOOD PRESSURE: 87 MMHG

## 2022-08-11 DIAGNOSIS — N30.10 INTERSTITIAL CYSTITIS: Primary | ICD-10-CM

## 2022-08-11 PROCEDURE — 3075F PR MOST RECENT SYSTOLIC BLOOD PRESS GE 130-139MM HG: ICD-10-PCS | Mod: CPTII,S$GLB,, | Performed by: NURSE PRACTITIONER

## 2022-08-11 PROCEDURE — 99214 PR OFFICE/OUTPT VISIT, EST, LEVL IV, 30-39 MIN: ICD-10-PCS | Mod: S$GLB,,, | Performed by: NURSE PRACTITIONER

## 2022-08-11 PROCEDURE — 1160F PR REVIEW ALL MEDS BY PRESCRIBER/CLIN PHARMACIST DOCUMENTED: ICD-10-PCS | Mod: CPTII,S$GLB,, | Performed by: NURSE PRACTITIONER

## 2022-08-11 PROCEDURE — 99214 OFFICE O/P EST MOD 30 MIN: CPT | Mod: S$GLB,,, | Performed by: NURSE PRACTITIONER

## 2022-08-11 PROCEDURE — 3079F DIAST BP 80-89 MM HG: CPT | Mod: CPTII,S$GLB,, | Performed by: NURSE PRACTITIONER

## 2022-08-11 PROCEDURE — 1159F MED LIST DOCD IN RCRD: CPT | Mod: CPTII,S$GLB,, | Performed by: NURSE PRACTITIONER

## 2022-08-11 PROCEDURE — 3079F PR MOST RECENT DIASTOLIC BLOOD PRESSURE 80-89 MM HG: ICD-10-PCS | Mod: CPTII,S$GLB,, | Performed by: NURSE PRACTITIONER

## 2022-08-11 PROCEDURE — 3075F SYST BP GE 130 - 139MM HG: CPT | Mod: CPTII,S$GLB,, | Performed by: NURSE PRACTITIONER

## 2022-08-11 PROCEDURE — 1159F PR MEDICATION LIST DOCUMENTED IN MEDICAL RECORD: ICD-10-PCS | Mod: CPTII,S$GLB,, | Performed by: NURSE PRACTITIONER

## 2022-08-11 PROCEDURE — 3008F BODY MASS INDEX DOCD: CPT | Mod: CPTII,S$GLB,, | Performed by: NURSE PRACTITIONER

## 2022-08-11 PROCEDURE — 3008F PR BODY MASS INDEX (BMI) DOCUMENTED: ICD-10-PCS | Mod: CPTII,S$GLB,, | Performed by: NURSE PRACTITIONER

## 2022-08-11 PROCEDURE — 1160F RVW MEDS BY RX/DR IN RCRD: CPT | Mod: CPTII,S$GLB,, | Performed by: NURSE PRACTITIONER

## 2022-08-11 RX ORDER — MIRABEGRON 50 MG/1
TABLET, FILM COATED, EXTENDED RELEASE ORAL
COMMUNITY
Start: 2022-07-01 | End: 2022-08-11

## 2022-08-11 RX ORDER — MIRABEGRON 50 MG/1
50 TABLET, FILM COATED, EXTENDED RELEASE ORAL DAILY
Qty: 90 TABLET | Refills: 3 | Status: SHIPPED | OUTPATIENT
Start: 2022-08-11 | End: 2023-08-11

## 2022-08-11 RX ORDER — ARGININE 500 MG
CAPSULE ORAL
COMMUNITY
Start: 2022-07-01

## 2022-08-11 NOTE — PROGRESS NOTES
Subjective:       Patient ID: Kraen Meyers is a 57 y.o. female.    Chief Complaint: Interstitial Cystitis      HPI: 57-year-old female followed for interstitial cystitis with breakthrough flares.  She is currently scheduled for a DMSO treatment 4. Tomorrow.  She states she is some better.  She was using Myrbetriq for bladder discomfort and frequency urgency.  This seemed to help somewhat as well but she ran out of medication.  Since last visit she has had no UTIs.  She has seen no blood in the urine.  She has been afebrile.  She denies any signs or symptoms of UTI today just bladder discomfort.       Past Medical History:   Past Medical History:   Diagnosis Date    Glaucoma     Hypertension     Interstitial cystitis        Past Surgical Historical:   Past Surgical History:   Procedure Laterality Date    CYSTOGASTROSTOMY      CYSTOSCOPY      GALLBLADDER SURGERY  02/2019    TONSILLECTOMY          Medications:   Medication List with Changes/Refills   Current Medications    AMITRIPTYLINE (ELAVIL) 50 MG TABLET    Take 1 tablet (50 mg total) by mouth every evening.    ARGININE, L-ARGININE, 500 MG CAP        BIOTIN 5,000 MCG TBDL        BROMELAINS 100 MG CHEW        ESOMEPRAZOLE (NEXIUM) 40 MG CAPSULE        ESTROGEN, CONJUGATED,-MEDROXYPROGESTERONE (PREMPRO) 0.45-1.5 MG PER TABLET        FOLIC ACID/MULTIVIT-MIN/LUTEIN (CENTRUM SILVER ORAL)        HYDROXYZINE HCL (ATARAX) 25 MG TABLET    TAKE 1 TABLET(25 MG) BY MOUTH EVERY DAY    LACTOBAC #2-BIFIDO #1-S. THERM (VISBIOME) 112.5 BILLION CELL CAP        LISINOPRIL (PRINIVIL,ZESTRIL) 20 MG TABLET    Take by mouth.    METOPROLOL SUCCINATE (TOPROL-XL) 50 MG 24 HR TABLET        MIRABEGRON (MYRBETRIQ) 50 MG TB24        OXYBUTYNIN (DITROPAN-XL) 10 MG 24 HR TABLET        SERTRALINE (ZOLOFT) 100 MG TABLET       Discontinued Medications    ALOE VERA 5,000 MG CAP    Take 2 capsules by mouth 3 (three) times daily as needed.    BREO ELLIPTA 100-25 MCG/DOSE DISKUS INHALER         CALCIUM CARBONATE/VITAMIN D3 (CALTRATE 600 + D ORAL)    Take by mouth.    CETIRIZINE (ZYRTEC) 10 MG TABLET        CYCLOSPORINE (RESTASIS MULTIDOSE) 0.05 % DROP        DEXLANSOPRAZOLE (DEXILANT) 30 MG CPDM        HYDROCODONE-ACETAMINOPHEN (NORCO)  MG PER TABLET    Take 1 tablet by mouth every 6 (six) hours.    LIPASE-PROTEASE-AMYLASE 24,000-76,000-120,000 UNITS (CREON) 24,000-76,000 -120,000 UNIT CAPSULE        LITHIUM ASPARTATE 20 MG CAP        METOPROLOL 10 MG/ML ORAL SOLUTION    Take by mouth.    METRONIDAZOLE 1% (METROGEL) 1 % GEL        NORETHINDRONE-ETHINYL ESTRADIOL (MICROGESTIN FE 1/20, 28,) 1 MG-20 MCG (21)/75 MG (7) PER TABLET        OLOPATADINE (PATANOL) 0.1 % OPHTHALMIC SOLUTION        PEDI MULTIVIT NO.12 W-FLUORIDE (MULTIVITAMINS-FLUORIDE-FOLIC A ORAL)        PHOSPHASAL 81.6-10.8-40.8 MG TAB    Take 1 tablet by mouth 4 (four) times daily as needed.    POLYCARBOPHIL (FIBERCON) 625 MG TABLET    Take by mouth.    PREDNISONE (DELTASONE) 10 MG TABLET            Past Social History:   Social History     Socioeconomic History    Marital status:    Tobacco Use    Smoking status: Never Smoker    Smokeless tobacco: Never Used   Substance and Sexual Activity    Alcohol use: Yes     Alcohol/week: 2.0 standard drinks     Types: 2 Glasses of wine per week     Comment: with supper    Drug use: Never       Allergies:   Review of patient's allergies indicates:   Allergen Reactions    Penicillins Hives and Itching        Family History:   Family History   Problem Relation Age of Onset    Hypertension Father     Glaucoma Father     Hypertension Mother     Diabetes Mother     Liver disease Mother         Review of Systems:  Review of Systems   Constitutional: Negative for activity change and appetite change.   HENT: Negative for congestion and dental problem.    Respiratory: Negative for chest tightness and shortness of breath.    Cardiovascular: Negative for chest pain.   Gastrointestinal: Negative  for abdominal distention and abdominal pain.   Genitourinary: Negative for decreased urine volume, difficulty urinating, dyspareunia, dysuria, enuresis, flank pain, frequency, genital sores, hematuria, pelvic pain and urgency.   Musculoskeletal: Negative for back pain and neck pain.   Allergic/Immunologic: Negative for immunocompromised state.   Neurological: Negative for dizziness.   Hematological: Negative for adenopathy.   Psychiatric/Behavioral: Negative for agitation, behavioral problems and confusion.       Physical Exam:  Physical Exam  Constitutional:       Appearance: She is well-developed.   HENT:      Head: Normocephalic and atraumatic.   Eyes:      General: No scleral icterus.  Pulmonary:      Effort: Pulmonary effort is normal.      Breath sounds: Normal breath sounds.   Abdominal:      General: There is no distension.      Palpations: Abdomen is soft.      Tenderness: There is no abdominal tenderness.   Genitourinary:     Exam position: Supine.      Labia:         Right: No rash, tenderness or lesion.         Left: No rash, tenderness or lesion.       Vagina: Normal.      Rectum: Normal.   Musculoskeletal:      Cervical back: Normal range of motion.   Skin:     General: Skin is warm and dry.   Neurological:      Mental Status: She is alert and oriented to person, place, and time.         Assessment/Plan:   Interstitial cystitis--urinalysis negative.  Continue with DMSO treatment 4. Tomorrow.  I refilled her Myrbetriq at pharmacy of record.  Also gave her a 1 month sample from her closet.  She is interested in cysto hydrodistention.  Will go ahead and get this scheduled with Dr. Quezada.    Problem List Items Addressed This Visit    None

## 2022-08-12 ENCOUNTER — CLINICAL SUPPORT (OUTPATIENT)
Dept: UROLOGY | Facility: CLINIC | Age: 57
End: 2022-08-12
Payer: COMMERCIAL

## 2022-08-12 DIAGNOSIS — N30.10 INTERSTITIAL CYSTITIS: Primary | ICD-10-CM

## 2022-08-12 PROCEDURE — 51700 PR IRRIGATION, BLADDER: ICD-10-PCS | Mod: S$GLB,,, | Performed by: NURSE PRACTITIONER

## 2022-08-12 PROCEDURE — 51700 IRRIGATION OF BLADDER: CPT | Mod: S$GLB,,, | Performed by: NURSE PRACTITIONER

## 2022-08-12 NOTE — PROGRESS NOTES
Treatment #3. Cleansed urethra with Hibiclense. Inserted 14 Lebanese red catheter into patient's bladder and drained the bladder. Step #1 pre-mixed (Heparin 1,000u/ml and Dexamethasone 8mg) instilled (slow push). Catheter held in place with plug. Patient was instructed to hold and rotate every 10 minutes for 30 minutes. After draining step #1 from bladder step #2 (DMSO irrigation 50ml was instilled (slow push). Catheter was discontinued. Patient was instructed to hold for 20 minutes then void. Patient tolerated procedure without complaints. Pt verbalized understanding of post instructions.    Pt was unable to handle 2nd step due to burning once it was installed.       Pre-op instructions and surgery consent HYDRODISTENTION reviewed with pt. Pt verbalized understanding. Surgery consent signed by pt.

## 2022-08-15 LAB
ANION GAP SERPL CALC-SCNC: 9 MMOL/L (ref 3–11)
APPEARANCE, UA: ABNORMAL
BASOPHILS NFR BLD: 0.4 % (ref 0–3)
BILIRUB UR QL STRIP: ABNORMAL MG/DL
BUN SERPL-MCNC: 15 MG/DL (ref 7–18)
BUN/CREAT SERPL: 30.61 RATIO (ref 7–18)
CALCIUM SERPL-MCNC: 9.3 MG/DL (ref 8.8–10.5)
CHLORIDE SERPL-SCNC: 102 MMOL/L (ref 100–108)
CO2 SERPL-SCNC: 29 MMOL/L (ref 21–32)
COLOR UR: ABNORMAL
CREAT SERPL-MCNC: 0.49 MG/DL (ref 0.55–1.02)
EOSINOPHIL NFR BLD: 2.5 % (ref 1–3)
ERYTHROCYTE [DISTWIDTH] IN BLOOD BY AUTOMATED COUNT: 12.1 % (ref 12.5–18)
GFR ESTIMATION: > 60
GLUCOSE (UA): NORMAL MG/DL
GLUCOSE SERPL-MCNC: 93 MG/DL (ref 70–110)
HCT VFR BLD AUTO: 39.7 % (ref 37–47)
HGB BLD-MCNC: 13.4 G/DL (ref 12–16)
HGB UR QL STRIP: NEGATIVE /UL
KETONES UR QL STRIP: NEGATIVE MG/DL
LEUKOCYTE ESTERASE UR QL STRIP: NEGATIVE /UL
LYMPHOCYTES NFR BLD: 33.6 % (ref 25–40)
MCH RBC QN AUTO: 28 PG (ref 27–31.2)
MCHC RBC AUTO-ENTMCNC: 33.8 G/DL (ref 31.8–35.4)
MCV RBC AUTO: 83.1 FL (ref 80–97)
MONOCYTES NFR BLD: 8.4 % (ref 1–15)
NEUTROPHILS # BLD AUTO: 3.79 10*3/UL (ref 1.8–7.7)
NEUTROPHILS NFR BLD: 54.7 % (ref 37–80)
NITRITE UR QL STRIP: POSITIVE
NUCLEATED RED BLOOD CELLS: 0 %
PH UR STRIP: 6 PH (ref 5–9)
PLATELETS: 157 10*3/UL (ref 142–424)
POTASSIUM SERPL-SCNC: 3.7 MMOL/L (ref 3.6–5.2)
PROT UR QL STRIP: ABNORMAL MG/DL
RBC # BLD AUTO: 4.78 10*6/UL (ref 4.2–5.4)
SODIUM BLD-SCNC: 140 MMOL/L (ref 135–145)
SP GR UR STRIP: 1.01 (ref 1–1.03)
SPECIMEN COLLECTION METHOD, URINE: ABNORMAL
UROBILINOGEN UR STRIP-ACNC: NORMAL MG/DL
WBC # BLD: 6.9 10*3/UL (ref 4.6–10.2)

## 2022-08-17 ENCOUNTER — OUTSIDE PLACE OF SERVICE (OUTPATIENT)
Dept: UROLOGY | Facility: CLINIC | Age: 57
End: 2022-08-17
Payer: COMMERCIAL

## 2022-08-17 PROCEDURE — 52260 PR CYSTOSCOPY,DIL BLADDER,GEN ANESTH: ICD-10-PCS | Mod: ,,, | Performed by: UROLOGY

## 2022-08-17 PROCEDURE — 52260 CYSTOSCOPY AND TREATMENT: CPT | Mod: ,,, | Performed by: UROLOGY

## 2022-08-17 RX ORDER — CIPROFLOXACIN 500 MG/1
500 TABLET ORAL 2 TIMES DAILY
Qty: 6 TABLET | Refills: 0 | Status: SHIPPED | OUTPATIENT
Start: 2022-08-17 | End: 2022-08-20

## 2022-08-26 ENCOUNTER — CLINICAL SUPPORT (OUTPATIENT)
Dept: UROLOGY | Facility: CLINIC | Age: 57
End: 2022-08-26
Payer: COMMERCIAL

## 2022-08-26 DIAGNOSIS — N30.10 INTERSTITIAL CYSTITIS: Primary | ICD-10-CM

## 2022-08-26 PROCEDURE — 51700 IRRIGATION OF BLADDER: CPT | Mod: S$GLB,,, | Performed by: UROLOGY

## 2022-08-26 PROCEDURE — 51700 PR IRRIGATION, BLADDER: ICD-10-PCS | Mod: S$GLB,,, | Performed by: UROLOGY

## 2022-08-26 NOTE — PROGRESS NOTES
Treatment #4. Cleansed urethra with Hibiclense. Inserted 14 [place] red catheter into patient's bladder and drained the bladder. Step #1 pre-mixed (Heparin 1,000u/ml and Dexamethasone 8mg) instilled (slow push). Catheter held in place with plug. Patient was instructed to hold and rotate every 10 minutes for 30 minutes. After draining step #1 from bladder step #2 (DMSO irrigation 50ml was instilled (slow push). Catheter was discontinued. Patient was instructed to hold for 20 minutes then void. Patient tolerated procedure without complaints. Pt verbalized understanding of post instructions.

## 2022-09-08 ENCOUNTER — OFFICE VISIT (OUTPATIENT)
Dept: UROLOGY | Facility: CLINIC | Age: 57
End: 2022-09-08
Payer: COMMERCIAL

## 2022-09-08 VITALS
SYSTOLIC BLOOD PRESSURE: 147 MMHG | HEART RATE: 74 BPM | TEMPERATURE: 98 F | DIASTOLIC BLOOD PRESSURE: 86 MMHG | RESPIRATION RATE: 20 BRPM | HEIGHT: 65 IN | WEIGHT: 209 LBS | BODY MASS INDEX: 34.82 KG/M2

## 2022-09-08 DIAGNOSIS — N30.10 INTERSTITIAL CYSTITIS: Primary | ICD-10-CM

## 2022-09-08 PROCEDURE — 3077F PR MOST RECENT SYSTOLIC BLOOD PRESSURE >= 140 MM HG: ICD-10-PCS | Mod: CPTII,S$GLB,, | Performed by: NURSE PRACTITIONER

## 2022-09-08 PROCEDURE — 3077F SYST BP >= 140 MM HG: CPT | Mod: CPTII,S$GLB,, | Performed by: NURSE PRACTITIONER

## 2022-09-08 PROCEDURE — 1159F PR MEDICATION LIST DOCUMENTED IN MEDICAL RECORD: ICD-10-PCS | Mod: CPTII,S$GLB,, | Performed by: NURSE PRACTITIONER

## 2022-09-08 PROCEDURE — 3079F PR MOST RECENT DIASTOLIC BLOOD PRESSURE 80-89 MM HG: ICD-10-PCS | Mod: CPTII,S$GLB,, | Performed by: NURSE PRACTITIONER

## 2022-09-08 PROCEDURE — 1159F MED LIST DOCD IN RCRD: CPT | Mod: CPTII,S$GLB,, | Performed by: NURSE PRACTITIONER

## 2022-09-08 PROCEDURE — 3079F DIAST BP 80-89 MM HG: CPT | Mod: CPTII,S$GLB,, | Performed by: NURSE PRACTITIONER

## 2022-09-08 PROCEDURE — 99213 OFFICE O/P EST LOW 20 MIN: CPT | Mod: S$GLB,,, | Performed by: NURSE PRACTITIONER

## 2022-09-08 PROCEDURE — 1160F PR REVIEW ALL MEDS BY PRESCRIBER/CLIN PHARMACIST DOCUMENTED: ICD-10-PCS | Mod: CPTII,S$GLB,, | Performed by: NURSE PRACTITIONER

## 2022-09-08 PROCEDURE — 3008F PR BODY MASS INDEX (BMI) DOCUMENTED: ICD-10-PCS | Mod: CPTII,S$GLB,, | Performed by: NURSE PRACTITIONER

## 2022-09-08 PROCEDURE — 1160F RVW MEDS BY RX/DR IN RCRD: CPT | Mod: CPTII,S$GLB,, | Performed by: NURSE PRACTITIONER

## 2022-09-08 PROCEDURE — 99213 PR OFFICE/OUTPT VISIT, EST, LEVL III, 20-29 MIN: ICD-10-PCS | Mod: S$GLB,,, | Performed by: NURSE PRACTITIONER

## 2022-09-08 PROCEDURE — 3008F BODY MASS INDEX DOCD: CPT | Mod: CPTII,S$GLB,, | Performed by: NURSE PRACTITIONER

## 2022-09-08 RX ORDER — OXYBUTYNIN CHLORIDE 10 MG/1
TABLET, EXTENDED RELEASE ORAL
COMMUNITY
Start: 2022-09-05 | End: 2023-01-03

## 2022-09-08 RX ORDER — METHENAMINE, SODIUM PHOSPHATE, MONOBASIC, MONOHYDRATE, PHENYL SALICYLATE, METHYLENE BLUE, AND HYOSCYAMINE SULFATE 40.8; 36.2; .12; 10.8; 81.6 MG/1; MG/1; MG/1; MG/1; MG/1
1 TABLET ORAL 4 TIMES DAILY PRN
COMMUNITY
Start: 2022-09-01

## 2022-09-08 RX ORDER — SCOLOPAMINE TRANSDERMAL SYSTEM 1 MG/1
PATCH, EXTENDED RELEASE TRANSDERMAL
COMMUNITY
Start: 2022-08-23

## 2022-09-08 RX ORDER — MONTELUKAST SODIUM 10 MG/1
10 TABLET ORAL DAILY
COMMUNITY
Start: 2022-09-01

## 2022-09-08 NOTE — PROGRESS NOTES
Subjective:       Patient ID: Karen Meyers is a 57 y.o. female.    Chief Complaint: Dysuria      HPI: 57-year-old female known to service he has follow-up cysto hydrodistention done 08/17/2020 to in the care of Dr. Quezada.  She states he is light night in days since the procedure.  She has so much better.  She continues with DMSO instillations our next procedure is tomorrow.  She could also continues on her current medications per formulary and is very happy that she is pain-free and things really working well for her at this time no new urologic complaints.       Past Medical History:   Past Medical History:   Diagnosis Date    Dysuria     Glaucoma     Hypertension     Interstitial cystitis        Past Surgical Historical:   Past Surgical History:   Procedure Laterality Date    CYSTOGASTROSTOMY      CYSTOSCOPY      GALLBLADDER SURGERY  02/2019    TONSILLECTOMY          Medications:   Medication List with Changes/Refills   Current Medications    AMITRIPTYLINE (ELAVIL) 50 MG TABLET    Take 1 tablet (50 mg total) by mouth every evening.    ARGININE, L-ARGININE, 500 MG CAP        BIOTIN 5,000 MCG TBDL        BROMELAINS 100 MG CHEW        ESOMEPRAZOLE (NEXIUM) 40 MG CAPSULE        ESTROGEN, CONJUGATED,-MEDROXYPROGESTERONE (PREMPRO) 0.45-1.5 MG PER TABLET        FOLIC ACID/MULTIVIT-MIN/LUTEIN (CENTRUM SILVER ORAL)        HYDROXYZINE HCL (ATARAX) 25 MG TABLET    TAKE 1 TABLET(25 MG) BY MOUTH EVERY DAY    LACTOBAC #2-BIFIDO #1-S. THERM (VISBIOME) 112.5 BILLION CELL CAP        METOPROLOL SUCCINATE (TOPROL-XL) 50 MG 24 HR TABLET        MIRABEGRON (MYRBETRIQ) 50 MG TB24    Take 1 tablet (50 mg total) by mouth once daily.    MONTELUKAST (SINGULAIR) 10 MG TABLET    Take 10 mg by mouth once daily.    OXYBUTYNIN (DITROPAN-XL) 10 MG 24 HR TABLET        PHOSPHASAL 81.6-10.8-40.8 MG TAB    Take 1 tablet by mouth 4 (four) times daily as needed.    SCOPOLAMINE (TRANSDERM-SCOP) 1.3-1.5 MG (1 MG OVER 3 DAYS)    APPLY 1 PATCH  TOPICALLY TO THE SKIN EVERY 3 DAYS    SERTRALINE (ZOLOFT) 100 MG TABLET       Discontinued Medications    LISINOPRIL (PRINIVIL,ZESTRIL) 20 MG TABLET    Take by mouth.        Past Social History:   Social History     Socioeconomic History    Marital status:    Tobacco Use    Smoking status: Never    Smokeless tobacco: Never   Substance and Sexual Activity    Alcohol use: Yes     Alcohol/week: 2.0 standard drinks     Types: 2 Glasses of wine per week     Comment: with supper    Drug use: Never       Allergies:   Review of patient's allergies indicates:   Allergen Reactions    Penicillins Hives and Itching        Family History:   Family History   Problem Relation Age of Onset    Hypertension Father     Glaucoma Father     Hypertension Mother     Diabetes Mother     Liver disease Mother         Review of Systems:  Review of Systems   Constitutional:  Negative for activity change and appetite change.   HENT:  Negative for congestion and dental problem.    Respiratory:  Negative for chest tightness and shortness of breath.    Cardiovascular:  Negative for chest pain.   Gastrointestinal:  Negative for abdominal distention.   Genitourinary:  Negative for decreased urine volume, difficulty urinating, dyspareunia, dysuria, enuresis, flank pain, frequency, genital sores, hematuria, pelvic pain and urgency.   Musculoskeletal:  Negative for back pain and neck pain.   Allergic/Immunologic: Negative for immunocompromised state.   Neurological:  Negative for dizziness.   Hematological:  Negative for adenopathy.   Psychiatric/Behavioral:  Negative for agitation, behavioral problems and confusion.      Physical Exam:  Physical Exam  Constitutional:       Appearance: She is well-developed.   HENT:      Head: Normocephalic and atraumatic.   Eyes:      General: No scleral icterus.  Pulmonary:      Effort: Pulmonary effort is normal.      Breath sounds: Normal breath sounds.   Abdominal:      General: There is no  distension.      Palpations: Abdomen is soft.      Tenderness: There is no abdominal tenderness.   Genitourinary:     Exam position: Supine.      Labia:         Right: No rash, tenderness or lesion.         Left: No rash, tenderness or lesion.       Vagina: Normal.      Rectum: Normal.   Musculoskeletal:      Cervical back: Normal range of motion.   Skin:     General: Skin is warm and dry.   Neurological:      Mental Status: She is alert and oriented to person, place, and time.       Assessment/Plan:   Interstitial cystitis--negative for UTI on urinalysis.  Patient is much better status post cysto hydrodistention.  She will continue current formulary for her IC as well as DMSO instillations as scheduled.  Problem List Items Addressed This Visit          Renal/    Interstitial cystitis - Primary    Relevant Orders    POCT Urinalysis (w/Micro Option)

## 2022-09-09 ENCOUNTER — CLINICAL SUPPORT (OUTPATIENT)
Dept: UROLOGY | Facility: CLINIC | Age: 57
End: 2022-09-09
Payer: COMMERCIAL

## 2022-09-09 DIAGNOSIS — N30.10 INTERSTITIAL CYSTITIS: Primary | ICD-10-CM

## 2022-09-09 PROCEDURE — 51700 IRRIGATION OF BLADDER: CPT | Mod: S$GLB,,, | Performed by: NURSE PRACTITIONER

## 2022-09-09 PROCEDURE — 51700 PR IRRIGATION, BLADDER: ICD-10-PCS | Mod: S$GLB,,, | Performed by: NURSE PRACTITIONER

## 2022-09-09 NOTE — PROGRESS NOTES
Treatment #5. Cleansed urethra with Hibiclense. Inserted 14 [place] red catheter into patient's bladder and drained the bladder. Step #1 pre-mixed (Heparin 1,000u/ml and Dexamethasone 8mg) instilled (slow push). Catheter held in place with plug. Patient was instructed to hold and rotate every 10 minutes for 30 minutes. After draining step #1 from bladder step #2 (DMSO irrigation 50ml was instilled (slow push). Catheter was discontinued. Patient was instructed to hold for 20 minutes then void. Patient tolerated procedure without complaints. Pt verbalized understanding of post instructions.

## 2022-09-23 ENCOUNTER — CLINICAL SUPPORT (OUTPATIENT)
Dept: UROLOGY | Facility: CLINIC | Age: 57
End: 2022-09-23
Payer: COMMERCIAL

## 2022-09-23 DIAGNOSIS — N30.10 INTERSTITIAL CYSTITIS: Primary | ICD-10-CM

## 2022-09-30 NOTE — PROGRESS NOTES
Cleansed urethra with Hibiclense. Inserted 14 Bengali red catheter into patient's bladder and drained the bladder. Step #1 60cc Sterile H2O, (Heparin 1,000units) and Dexamethasone 8mg) instilled into bladder via catheter, slow push. Catheter held in place with plug. Patient was instructed to hold and rotate every 10 minutes for 30 minutes. After draining step #1 from bladder step #2 (DMSO irrigation 50ml mixed with 15ml 2% lidocain instilled into bladder via slow push. Catheter was discontinued. Patient was instructed to hold instillation for 20 minutes then completely empty bladder . Patient tolerated procedure without complaints. Pt verbalized understanding of post instructions.

## 2022-10-14 ENCOUNTER — CLINICAL SUPPORT (OUTPATIENT)
Dept: UROLOGY | Facility: CLINIC | Age: 57
End: 2022-10-14
Payer: COMMERCIAL

## 2022-10-14 NOTE — PROGRESS NOTES
Treatment #5 - Pt astrid area cleaned with Hibiclens.  14 FR red catheter placed without difficulty. Bladder drained of urine. Yellow/green in color - 200 cc.   Step 1: 60 cc syringe of Heparin/Dexamethasone mix instilled slowly.  Plugged and secured with tape to inner thigh.  Pt previously instructed to turn Q10 min from back to left to right side.  Plug removed and bladder drained.  Step 2:  RIMSO/2% Lidocaine mixture instilled with piston syringe.  Catheter removed with tip intact.  Patient instructed to let dwell for 20 minutes then empty bladder.  Pt acknowledges understanding.  Treatment #6 scheduled for next week.  Pt tolerated well.

## 2022-10-21 ENCOUNTER — CLINICAL SUPPORT (OUTPATIENT)
Dept: UROLOGY | Facility: CLINIC | Age: 57
End: 2022-10-21
Payer: COMMERCIAL

## 2022-10-21 NOTE — PROGRESS NOTES
Treatment #6 - Pt astrid area cleaned with Hibiclens.  14 FR red catheter placed without difficulty. Bladder drained of urine. Yellow in color - 100 cc.     Step 1: 60 cc syringe of Heparin/Dexamethasone mix instilled slowly.  Plugged and secured with tape to inner thigh.  Pt previously instructed to turn Q10 min from back to left to right side.  After 30 minutes, plug removed and bladder drained.    Step 2:  RIMSO/2% Lidocaine mixture instilled with piston syringe.  Catheter removed with tip intact.  Patient instructed to let dwell for 20 minutes then empty bladder.  Pt acknowledges understanding.  6 month follow-up scheduled.  Encouraged pt to call with any issues.   Pt tolerated well.

## 2022-10-31 ENCOUNTER — TELEPHONE (OUTPATIENT)
Dept: UROLOGY | Facility: CLINIC | Age: 57
End: 2022-10-31
Payer: COMMERCIAL

## 2022-10-31 NOTE — TELEPHONE ENCOUNTER
Attempted to contact pt, no answer no vm setup. BJP    ----- Message from Daniela Garcia sent at 10/28/2022  4:50 PM CDT -----  Contact: pt    ----- Message -----  From: Kimmie Delgado  Sent: 10/28/2022   4:41 PM CDT  To: Dam[facility] Staff        Who Called:shonna  Who Left Message for Patient:  Does the patient know what this is regarding?:pt wants to make appt for instillation for next Friday   Would the patient rather a call back or a response via [facility]?   Best Call Back Number:.[phone]    Additional Information:

## 2022-11-04 DIAGNOSIS — N30.10 INTERSTITIAL CYSTITIS: ICD-10-CM

## 2022-11-04 RX ORDER — HYDROXYZINE HYDROCHLORIDE 25 MG/1
TABLET, FILM COATED ORAL
Qty: 30 TABLET | Refills: 2 | Status: SHIPPED | OUTPATIENT
Start: 2022-11-04

## 2023-01-03 RX ORDER — OXYBUTYNIN CHLORIDE 10 MG/1
TABLET, EXTENDED RELEASE ORAL
Qty: 30 TABLET | Refills: 6 | Status: SHIPPED | OUTPATIENT
Start: 2023-01-03

## 2023-02-02 DIAGNOSIS — N30.10 INTERSTITIAL CYSTITIS: ICD-10-CM

## 2023-02-03 RX ORDER — AMITRIPTYLINE HYDROCHLORIDE 50 MG/1
TABLET, FILM COATED ORAL
Qty: 30 TABLET | Refills: 11 | Status: SHIPPED | OUTPATIENT
Start: 2023-02-03 | End: 2024-02-19

## 2024-02-19 ENCOUNTER — TELEPHONE (OUTPATIENT)
Dept: UROLOGY | Facility: CLINIC | Age: 59
End: 2024-02-19
Payer: COMMERCIAL

## 2024-02-19 DIAGNOSIS — N30.10 INTERSTITIAL CYSTITIS: ICD-10-CM

## 2024-02-19 RX ORDER — AMITRIPTYLINE HYDROCHLORIDE 50 MG/1
TABLET, FILM COATED ORAL
Qty: 30 TABLET | Refills: 0 | Status: SHIPPED | OUTPATIENT
Start: 2024-02-19

## 2024-02-19 NOTE — TELEPHONE ENCOUNTER
Called pt to let her know we sent a refill of Macrobid to her pharmacy, however we do need to get her scheduled for an apt since we have not seen her in a while. Pt did not answer, LVM.